# Patient Record
Sex: MALE | Race: BLACK OR AFRICAN AMERICAN | NOT HISPANIC OR LATINO | URBAN - METROPOLITAN AREA
[De-identification: names, ages, dates, MRNs, and addresses within clinical notes are randomized per-mention and may not be internally consistent; named-entity substitution may affect disease eponyms.]

---

## 2020-01-01 ENCOUNTER — EMERGENCY (EMERGENCY)
Facility: HOSPITAL | Age: 85
LOS: 0 days | End: 2020-04-13
Attending: EMERGENCY MEDICINE
Payer: MEDICARE

## 2020-01-01 VITALS — TEMPERATURE: 95 F | OXYGEN SATURATION: 55 %

## 2020-01-01 VITALS — WEIGHT: 139.99 LBS

## 2020-01-01 DIAGNOSIS — G20 PARKINSON'S DISEASE: ICD-10-CM

## 2020-01-01 DIAGNOSIS — Z85.46 PERSONAL HISTORY OF MALIGNANT NEOPLASM OF PROSTATE: ICD-10-CM

## 2020-01-01 DIAGNOSIS — I46.9 CARDIAC ARREST, CAUSE UNSPECIFIED: ICD-10-CM

## 2020-01-01 LAB — GLUCOSE BLDC GLUCOMTR-MCNC: 117 MG/DL — HIGH (ref 70–99)

## 2020-01-01 PROCEDURE — 99285 EMERGENCY DEPT VISIT HI MDM: CPT | Mod: 25

## 2020-01-01 PROCEDURE — 92950 HEART/LUNG RESUSCITATION CPR: CPT

## 2020-04-13 NOTE — ED ADULT TRIAGE NOTE - CHIEF COMPLAINT QUOTE
pt BIBA with c/o cardiac arrest, ROSC obtained in the field then lost, given epi and dopamine, brought into the ED with no pulse

## 2020-04-13 NOTE — ED PROVIDER NOTE - OBJECTIVE STATEMENT
Patient was found on the ground at home unresponsive by family member; unknown length of time however at least 15 min; EMS Patient was found on the ground at home unresponsive by family member; unknown length of time however at least 15 min; EMS noted asystole, intubated in the field and initiated with ROSC approximately 25 min later however patient noted to become astyolic again 10 min PTA

## 2020-04-13 NOTE — ED PROVIDER NOTE - CARE PLAN
On chronic O2, Pulomnary plan noted. Completed 7 days of biaxin. Home Trilogy vent arranged by pulmonary. c/w intermittent bipap ; alternating with 5L NC and daliresp, azithromycin   Plan for discharge to acute pulmonary rehab  ENT evaluated, pt s/p laryngoscope wnl as part of preopr workup prior to transplant   Pulm coordinating lung transplant at [place] but will likely need pulm rehab first  - c/w Prednisone taper per pulm, Pulmicort & Duoneb nebs,   - c/w Theophylline, Singulair. Principal Discharge DX:	Cardiopulmonary arrest

## 2020-04-13 NOTE — ED PROVIDER NOTE - CLINICAL SUMMARY MEDICAL DECISION MAKING FREE TEXT BOX
ACLS continued in the ED however no ROSC; no cardiac activity confirmed on ultrasound; patient pronounced at 0924. ACLS continued in the ED however no ROSC; no cardiac activity confirmed on ultrasound; patient pronounced at 0924; family (Todd Matamoros [phone]) notified

## 2023-02-19 NOTE — ED ADULT NURSE NOTE - CHIEF COMPLAINT QUOTE
pt BIBA with c/o cardiac arrest, ROSC obtained in the field then lost, given epi and dopamine, brought into the ED with no pulse Patient/Caregiver provided printed discharge information.

## 2025-03-25 NOTE — ED PROVIDER NOTE - INTERNATIONAL TRAVEL
Infectious Diseases Consult Note    Referring Provider: Tian Roca MD    Reason for Consultation: Bacteremia    Patient Care Team:  Serenity Wren MD as PCP - General (Family Medicine)  Steve Muhammad MD as Consulting Physician (Cardiology)  CAROLE Vasquez DPM as Consulting Physician (Podiatry)  David Urbina MD as Consulting Physician (Otolaryngology)  Gregory Valle MD as Surgeon (Vascular Surgery)  Gómez Arvizu MD as Consulting Physician (Ophthalmology)  Radha Danielle MD as Consulting Physician (Cardiology)  Modesto Mccall MD as Consulting Physician (Nephrology)    Chief complaint lower extremity weakness    Subjective     History of present illness:      This is a 85-year-old male who presents to the hospital on 3/21/2025 due to lower extremity weakness had been going on for approximately 5 days.  Patient recently had a pacemaker placed on 2/24/2025 and had a TAVR procedure last year.  Patient spiked a fever yesterday and blood cultures are now growing Enterococcus.  Denies any more fever or chills, significant shortness of breath or productive cough.  Denies GI symptoms or urinary symptoms    Review of Systems   Review of Systems   HENT: Negative.     Eyes: Negative.    Respiratory: Negative.     Cardiovascular: Negative.    Gastrointestinal: Negative.    Endocrine: Negative.    Genitourinary: Negative.    Musculoskeletal: Negative.    Skin: Negative.    Neurological:  Positive for weakness.   Psychiatric/Behavioral: Negative.     All other systems reviewed and are negative.      Medications  Medications Prior to Admission   Medication Sig Dispense Refill Last Dose/Taking    aspirin 81 MG chewable tablet Chew 1 tablet Daily. 30 tablet 1 3/21/2025    atorvastatin (LIPITOR) 80 MG tablet TAKE ONE TABLET BY MOUTH EVERY DAY 90 tablet 1 3/20/2025    levothyroxine (Synthroid) 112 MCG tablet Take 1 tablet by mouth Daily. 90 tablet 1 3/21/2025    losartan (COZAAR) 100 MG tablet Take 25 mg by  mouth Daily.   3/21/2025    nebivolol (BYSTOLIC) 2.5 MG tablet Take 1 tablet by mouth Daily for 30 days. 30 tablet 0 3/21/2025    NIFEdipine CC (ADALAT CC) 30 MG 24 hr tablet Take 1 tablet by mouth Daily.   3/21/2025    rivaroxaban (Xarelto) 15 MG tablet Take 1 tablet by mouth Daily With Dinner. 90 tablet 0 3/20/2025    SITagliptin (Januvia) 50 MG tablet Take 1 tablet by mouth Daily. 90 tablet 0 3/21/2025    Artificial Tear Ointment (artificial tears) ophthalmic ointment Administer  to both eyes Every 1 (One) Hour As Needed.       fluticasone (FLONASE) 50 MCG/ACT nasal spray Administer 2 sprays into the nostril(s) as directed by provider Daily As Needed.       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    Stroke      Past Surgical History:   Procedure Laterality Date    ANKLE OPEN REDUCTION INTERNAL FIXATION Left 12/16/2020    Procedure: ANKLE OPEN REDUCTION INTERNAL FIXATION;  Surgeon: CAROLE Vasquez DPM;  Location: [facility] MAIN OR;  Service: Podiatry;  Laterality: Left;    AORTIC VALVE REPAIR/REPLACEMENT N/A 02/20/2023    Procedure: Transfemoral Transcatheter Aortic Valve Replacement;  Surgeon: Naveed Leonardo MD;  Location: [facility] HYBRID OR;  Service: Cardiovascular;  Laterality: N/A;    AORTIC VALVE REPAIR/REPLACEMENT N/A 02/20/2023    Procedure: TRANSCATHETER AORTIC VALVE REPLACEMENT;  Surgeon: Fabio Villafana MD;  Location: [facility] HYBRID OR;  Service: Cardiothoracic;  Laterality: N/A;    BACK SURGERY  1994    CARDIAC CATHETERIZATION Right 09/27/2022    Procedure: Left Heart Cath;  Surgeon: Steve Muhammad MD;   Location: [facility] CATH INVASIVE LOCATION;  Service: Cardiovascular;  Laterality: Right;    CARDIAC CATHETERIZATION N/A 01/12/2023    Procedure: Stent JENN coronary;  Surgeon: Steve Muhammad MD;  Location: [facility] CATH INVASIVE LOCATION;  Service: Cardiovascular;  Laterality: N/A;    CARDIAC ELECTROPHYSIOLOGY PROCEDURE N/A 02/24/2025    Procedure: Pacemaker DC new, BiV PPM for complete heart block;  Surgeon: Radha Danielle MD;  Location: [facility] CATH INVASIVE LOCATION;  Service: Cardiovascular;  Laterality: N/A;    CAROTID ENDARTERECTOMY Right 12/14/2020    Procedure: CAROTID ENDARTERECTOMY;  Surgeon: Toby Fung MD;  Location: [facility] MAIN OR;  Service: Vascular;  Laterality: Right;    COLONOSCOPY  08/25/2015    CORONARY STENT PLACEMENT      PACEMAKER IMPLANTATION      2/24/25    PROSTATECTOMY  2001    due to cancer       Family History  Family History   Problem Relation Age of Onset    Hypertension Other     Heart attack Mother     Heart disease Mother     Heart attack Father     Heart disease Father        Social History   reports that he has never smoked. He has never been exposed to tobacco smoke. He has never used smokeless tobacco. He reports that he does not drink alcohol and does not use drugs.    Allergies  Azithromycin and Tramadol    Objective     Vital Signs   Vital Signs (last 24 hours)         03/24 0700  03/25 0659 03/25 0700  03/25 1658   Most Recent      Temp (°F) 97.4 -  100.6       98.7 (37.1) 03/25 0459    Heart Rate 60 -  72       66 03/25 0459    Resp 13 -  27       17 03/25 0459    /65 -  180/72       176/72 03/25 0459    SpO2 (%) 93 -  100       93 03/25 0459            Physical Exam:  Physical Exam  Vitals and nursing note reviewed.   Constitutional:       General: He is not in acute distress.     Appearance: He is well-developed and normal weight. He is ill-appearing. He is not diaphoretic.   HENT:      Head: Normocephalic and atraumatic.   Eyes:      Conjunctiva/sclera:  Conjunctivae normal.      Pupils: Pupils are equal, round, and reactive to light.   Cardiovascular:      Rate and Rhythm: Normal rate and regular rhythm.      Heart sounds: Normal heart sounds, S1 normal and S2 normal.   Pulmonary:      Effort: Pulmonary effort is normal. No respiratory distress.      Breath sounds: Normal breath sounds. No stridor. No wheezing or rales.   Abdominal:      General: Bowel sounds are normal. There is no distension.      Palpations: Abdomen is soft. There is no mass.      Tenderness: There is no abdominal tenderness. There is no guarding.   Musculoskeletal:         General: No deformity.      Cervical back: Neck supple.   Skin:     General: Skin is warm and dry.      Coloration: Skin is not pale.      Findings: No erythema or rash.   Neurological:      Mental Status: He is alert and oriented to person, place, and time.      Motor: Weakness present.         Microbiology  Microbiology Results (last 10 days)       Procedure Component Value - Date/Time    Blood Culture - Blood, Hand, Left [[id number]]  (Abnormal) Collected: 03/25/25 0005    Lab Status: Preliminary result Specimen: Blood from Hand, Left Updated: 03/25/25 1638     Blood Culture Abnormal Stain     Gram Stain Anaerobic Bottle Gram positive cocci in chains    Respiratory Panel PCR w/COVID-19(SARS-CoV-2) SUPA/GURU/LORI/PAD/COR/DEANGELO In-House, NP Swab in UTM/VTM, 2 HR TAT - Swab, Nasopharynx [[id number]]  (Normal) Collected: 03/24/25 2156    Lab Status: Final result Specimen: Swab from Nasopharynx Updated: 03/24/25 2310     ADENOVIRUS, PCR Not Detected     Coronavirus 229E Not Detected     Coronavirus HKU1 Not Detected     Coronavirus NL63 Not Detected     Coronavirus OC43 Not Detected     COVID19 Not Detected     Human Metapneumovirus Not Detected     Human Rhinovirus/Enterovirus Not Detected     Influenza A PCR Not Detected     Influenza B PCR Not Detected     Parainfluenza Virus 1 Not Detected     Parainfluenza Virus 2 Not Detected      Parainfluenza Virus 3 Not Detected     Parainfluenza Virus 4 Not Detected     RSV, PCR Not Detected     Bordetella pertussis pcr Not Detected     Bordetella parapertussis PCR Not Detected     Chlamydophila pneumoniae PCR Not Detected     Mycoplasma pneumo by PCR Not Detected    Narrative:      In the setting of a positive respiratory panel with a viral infection PLUS a negative procalcitonin without other underlying concern for bacterial infection, consider observing off antibiotics or discontinuation of antibiotics and continue supportive care. If the respiratory panel is positive for atypical bacterial infection (Bordetella pertussis, Chlamydophila pneumoniae, or Mycoplasma pneumoniae), consider antibiotic de-escalation to target atypical bacterial infection.    Blood Culture - Blood, Hand, Right [[id number]]  (Abnormal) Collected: 03/24/25 2028    Lab Status: Preliminary result Specimen: Blood from Hand, Right Updated: 03/25/25 1247     Blood Culture Abnormal Stain     Gram Stain Anaerobic Bottle Gram positive cocci in chains      Aerobic Bottle Gram positive cocci in chains    Blood Culture ID, PCR - Blood, Hand, Right [[id number]]  (Abnormal) Collected: 03/24/25 2028    Lab Status: Final result Specimen: Blood from Hand, Right Updated: 03/25/25 1242     BCID, PCR Enterococcus faecium. Saúl/B (vancomycin resistance gene) not detected. Identification by BCID2 PCR.     BOTTLE TYPE Anaerobic Bottle    Narrative:      Infectious disease consultation is highly recommended to rule out distant foci of infection.            Laboratory  Results from last 7 days   Lab Units 03/25/25  0519   WBC 10*3/mm3 10.00   HEMOGLOBIN g/dL 10.8*   HEMATOCRIT % 36.1*   PLATELETS 10*3/mm3 227     Results from last 7 days   Lab Units 03/25/25  0005   SODIUM mmol/L 139   POTASSIUM mmol/L 3.9   CHLORIDE mmol/L 108*   CO2 mmol/L 20.8*   BUN mg/dL 23   CREATININE mg/dL 1.38*   GLUCOSE mg/dL 154*   CALCIUM mg/dL 8.3*     Results from last 7  days   Lab Units 03/25/25  0005   SODIUM mmol/L 139   POTASSIUM mmol/L 3.9   CHLORIDE mmol/L 108*   CO2 mmol/L 20.8*   BUN mg/dL 23   CREATININE mg/dL 1.38*   GLUCOSE mg/dL 154*   CALCIUM mg/dL 8.3*                   Radiology  Imaging Results (Last 72 Hours)       Procedure Component Value Units Date/Time    XR Chest 1 View [[id number]] Collected: 03/24/25 2048     Updated: 03/24/25 2051    Narrative:      XR CHEST 1 VW    Date of Exam: 3/24/2025 8:00 PM EDT    Indication: elevated temperature    Comparison: Chest AP dated 2/24/2025    Findings:  The lungs are clear bilaterally. The cardiac and mediastinal silhouettes appear normal. No effusion is seen. A TAVR has been performed. A dual-lead transvenous pacemaker has been placed.      Impression:      Impression:  No acute cardiopulmonary disease.      Electronically Signed: Wiliam Jasso MD    3/24/2025 8:49 PM EDT    Workstation ID: [id number]    CT Head Without Contrast [[id number]] Collected: 03/24/25 1635     Updated: 03/24/25 1640    Narrative:      CT HEAD WO CONTRAST    Date of Exam: 3/24/2025 4:23 PM EDT    Indication: weakness, ambulation difficulties.    Comparison: None available.    Technique: Axial CT images were obtained of the head without contrast administration.  Coronal reconstructions were performed.  Automated exposure control and iterative reconstruction methods were used.      Findings:  There is no evidence of hemorrhage. There is no mass effect or midline shift. There is diffuse brain atrophy. Periventricular hypodensities compatible with chronic small vessel ischemia. Chronic right basal ganglia lacunar infarct    There is no extracerebral collection.    Ventricles are normal in size and configuration for patient's stated age.     Posterior fossa is within normal limits.    Calvarium and skull base appear intact.  Visualized sinuses show no air fluid levels. Visualized orbits are unremarkable.        Impression:      Impression:  1.No acute  No intracranial abnormality identified.  2.Chronic small vessel ischemia and chronic right basal ganglia lacunar infarct.        Electronically Signed: Kingsley Lynch MD    3/24/2025 4:38 PM EDT    Workstation ID: [id number]            Cardiology      Results Review:  I have reviewed all clinical data, test, lab, and imaging results.       Schedule Meds  aspirin, 81 mg, Oral, Daily  atorvastatin, 80 mg, Oral, Daily  insulin lispro, 2-7 Units, Subcutaneous, 4x Daily AC & at Bedtime  levothyroxine, 112 mcg, Oral, Daily  losartan, 25 mg, Oral, Daily  nebivolol, 2.5 mg, Oral, Q24H  NIFEdipine XL, 30 mg, Oral, Daily  pantoprazole, 40 mg, Oral, Daily  rivaroxaban, 15 mg, Oral, Daily With Dinner  sodium chloride, 10 mL, Intravenous, Q12H  [START ON 3/26/2025] vancomycin, 1,250 mg, Intravenous, Q24H  vancomycin, 2,000 mg, Intravenous, Once        Infusion Meds  Pharmacy to dose vancomycin,         PRN Meds    acetaminophen    senna-docusate sodium **AND** polyethylene glycol **AND** bisacodyl **AND** bisacodyl    Calcium Replacement - Follow Nurse / BPA Driven Protocol    dextrose    dextrose    glucagon (human recombinant)    guaifenesin    HYDROcodone-acetaminophen    Magnesium Standard Dose Replacement - Follow Nurse / BPA Driven Protocol    nitroglycerin    oxyCODONE    Pharmacy to dose vancomycin    Phosphorus Replacement - Follow Nurse / BPA Driven Protocol    Potassium Replacement - Follow Nurse / BPA Driven Protocol    sodium chloride    sodium chloride      Assessment & Plan       Assessment    Enterococcus faecium bacteremia with no obvious source.  We need to rule out endocarditis.  CT scan of abdomen and pelvis was negative as well as urinalysis    History of pacemaker placement in July 2024 from complete heart block    History of TAVR procedure in 2023    Type 2 diabetes-most recent A1c is 6.79    History of prostate cancer    Plan    Continue IV vancomycin.  Currently pharmacy following and dosing  Waiting on  blood cultures to finalize  Repeat blood culture  2D echo  Consult cardiology for CHRISTIANO  CT of the abdomen pelvis without contrast  Continue supportive care  A.m. labs  Case discussed with hospitalist    The above note was transcribed for Dr. Bagley-physical exam and review of systems were performed by him    LARRY Patel  03/25/25  16:58 EDT    Note is dictated utilizing voice recognition software/Dragon